# Patient Record
Sex: MALE | Race: WHITE | NOT HISPANIC OR LATINO | URBAN - NONMETROPOLITAN AREA
[De-identification: names, ages, dates, MRNs, and addresses within clinical notes are randomized per-mention and may not be internally consistent; named-entity substitution may affect disease eponyms.]

---

## 2019-11-11 ENCOUNTER — IMPORTED ENCOUNTER (OUTPATIENT)
Dept: URBAN - NONMETROPOLITAN AREA CLINIC 1 | Facility: CLINIC | Age: 69
End: 2019-11-11

## 2019-11-11 PROBLEM — H26.493: Noted: 2019-11-11

## 2019-11-11 PROBLEM — Z96.1: Noted: 2019-11-11

## 2019-11-11 PROBLEM — H52.4: Noted: 2019-11-11

## 2019-11-11 PROCEDURE — 92012 INTRM OPH EXAM EST PATIENT: CPT

## 2019-11-11 NOTE — PATIENT DISCUSSION
History of Herpes keratitis - Discussed diagnosis in detail with  patient - D/C Zirgan    - D/C Besivance  - D/C Valtrex - Continue to monitor Pseudophakia OU with mild PCO OU- Discussed diagnosis in detail with patient- Patient is stable at this time - PCO noted but stable and no treatment needed at this time - May call me and being that patient lives so far away we can set patient up for YAG PC with Dr. Lezlie Siemens when he is ready - BAT at next visit - Continue to monitorPresbyopia OU- Discussed refractive status in detail with patient- New glasses Rx given but not required  - Continue to monitor; 's Notes: MRDFE 5/31/18

## 2020-02-03 ENCOUNTER — IMPORTED ENCOUNTER (OUTPATIENT)
Dept: URBAN - NONMETROPOLITAN AREA CLINIC 1 | Facility: CLINIC | Age: 70
End: 2020-02-03

## 2020-02-03 PROBLEM — H52.4: Noted: 2020-02-03

## 2020-02-03 PROBLEM — B00.52: Noted: 2020-02-03

## 2020-02-03 PROBLEM — H26.493: Noted: 2020-02-03

## 2020-02-03 PROBLEM — Z96.1: Noted: 2020-02-03

## 2020-02-03 PROCEDURE — 99213 OFFICE O/P EST LOW 20 MIN: CPT

## 2020-02-03 NOTE — PATIENT DISCUSSION
History of Herpes keratitis - Discussed diagnosis in detail with  patient - Denrites noted today OD - (-) for [facility]- Start Valtrex 1 gram TID x 7 days .  Rx sent to pharmacy- Start Besivance TID OD until sample is gone - Start Prolensa QD OD until sample given today - Start E-mycin ointment at bedtimeOU Rx sent to pharmacy- Continue to monitor ------------------------------previous notes-------------------------Pseudophakia OU with mild PCO OU- Discussed diagnosis in detail with patient- Patient is stable at this time - PCO noted but stable and no treatment needed at this time - May call me and being that patient lives so far away we can set patient up for YAG PC with Dr. Zack Gusman when he is ready - BAT at next visit - Continue to monitorPresbyopia OU- Discussed refractive status in detail with patient- New glasses Rx given but not required  - Continue to monitor; 's Notes: MRDFE 5/31/18

## 2020-11-24 ENCOUNTER — IMPORTED ENCOUNTER (OUTPATIENT)
Dept: URBAN - NONMETROPOLITAN AREA CLINIC 1 | Facility: CLINIC | Age: 70
End: 2020-11-24

## 2020-11-24 PROBLEM — H52.4: Noted: 2020-11-24

## 2020-11-24 PROBLEM — Z96.1: Noted: 2020-11-24

## 2020-11-24 PROBLEM — H26.493: Noted: 2020-11-24

## 2020-11-24 PROBLEM — H35.372: Noted: 2020-11-24

## 2020-11-24 PROCEDURE — 92015 DETERMINE REFRACTIVE STATE: CPT

## 2020-11-24 PROCEDURE — 92014 COMPRE OPH EXAM EST PT 1/>: CPT

## 2020-11-24 NOTE — PATIENT DISCUSSION
Presbyopia OU- Discussed refractive status in detail with patient- MR done new glasses Rx given but happy using OTC readers PRN- Continue to monitor Pseudophakia OU with mild PCO OU- Discussed diagnosis in detail with patient- Patient is stable at this time - PCO noted but stable and no treatment needed at this time - Continue to monitorERM OD-  Discussed findings w/ pt today-  Signs/symptoms associated with changes discussed-  Would like baseline OCT of macula next visit-  Continue to monitor PRN History of Herpes keratitis - Discussed diagnosis in detail with  patient - (-) for [facility] at that time- Previously treated with Valtrex Besivance Prolensa and E-mycin.- Continue to monitor PRN; 's Notes: MRDFE 5/31/18

## 2021-11-30 ENCOUNTER — IMPORTED ENCOUNTER (OUTPATIENT)
Dept: URBAN - NONMETROPOLITAN AREA CLINIC 1 | Facility: CLINIC | Age: 71
End: 2021-11-30

## 2021-11-30 PROBLEM — D31.31: Noted: 2021-11-30

## 2021-11-30 PROBLEM — H35.372: Noted: 2020-11-24

## 2021-11-30 PROBLEM — H52.4: Noted: 2020-11-24

## 2021-11-30 PROBLEM — H26.493: Noted: 2020-11-24

## 2021-11-30 PROBLEM — Z96.1: Noted: 2020-11-24

## 2021-11-30 PROCEDURE — 92015 DETERMINE REFRACTIVE STATE: CPT

## 2021-11-30 PROCEDURE — 92014 COMPRE OPH EXAM EST PT 1/>: CPT

## 2021-11-30 NOTE — PATIENT DISCUSSION
Presbyopia OU- Discussed refractive status in detail with patient- MR done by me new glasses Rx given - Continue to monitor Pseudophakia OU with mild PCO OU- Discussed diagnosis in detail with patient- Patient is stable at this time - PCO noted but stable and no treatment needed at this time - Continue to monitorERM OD-  Discussed findings w/ pt today-  Signs/symptoms associated with changes discussed-  Would like baseline OCT of macula in near future-  Continue to monitor PRN Chorioretinal Nevus OD-  Discussed findings w/ pt today-  Stable in shape/size-  Patient has hx of melanoma diagnosis by dermatologist in the past will need to monitor closely.  -  May need to obtain Optos next visit for baseline-  Monitor yearly or PRN History of Herpes keratitis - Discussed diagnosis in detail with  patient - (-) for [facility] at that time- Previously treated with Valtrex Besivance Prolensa and E-mycin.- Continue to monitor PRN; 's Notes: MR  11/30/2021 (by Shelbi)DFE  11/30/2021

## 2022-04-09 ASSESSMENT — TONOMETRY
OS_IOP_MMHG: 11
OS_IOP_MMHG: 10
OD_IOP_MMHG: 10
OS_IOP_MMHG: 10
OD_IOP_MMHG: 09
OS_IOP_MMHG: 10
OD_IOP_MMHG: 10

## 2022-04-09 ASSESSMENT — VISUAL ACUITY
OS_SC: 20/20
OD_CC: 20/40+1
OD_CC: 20/22-2
OD_CC: 20/30-
OD_SC: 20/25
OS_CC: 20/50-
OS_PH: 20/29-
OS_CC: 20/20
OD_PH: 20/20
OS_CC: 20/20

## 2022-12-02 ENCOUNTER — ESTABLISHED PATIENT (OUTPATIENT)
Dept: URBAN - NONMETROPOLITAN AREA CLINIC 1 | Facility: CLINIC | Age: 72
End: 2022-12-02

## 2022-12-02 PROCEDURE — 92014 COMPRE OPH EXAM EST PT 1/>: CPT

## 2022-12-02 PROCEDURE — 92499OP2 OPTOMAP RETINAL SCREENING BOTH EYES

## 2022-12-02 PROCEDURE — 92015 DETERMINE REFRACTIVE STATE: CPT

## 2022-12-02 ASSESSMENT — VISUAL ACUITY
OS_SC: 20/20
OD_SC: 20/20

## 2022-12-02 ASSESSMENT — TONOMETRY
OD_IOP_MMHG: 10
OS_IOP_MMHG: 10

## 2023-12-04 ENCOUNTER — ESTABLISHED PATIENT (OUTPATIENT)
Dept: URBAN - NONMETROPOLITAN AREA CLINIC 1 | Facility: CLINIC | Age: 73
End: 2023-12-04

## 2023-12-04 DIAGNOSIS — H35.372: ICD-10-CM

## 2023-12-04 DIAGNOSIS — H35.361: ICD-10-CM

## 2023-12-04 DIAGNOSIS — H26.493: ICD-10-CM

## 2023-12-04 DIAGNOSIS — H52.4: ICD-10-CM

## 2023-12-04 PROCEDURE — 99214 OFFICE O/P EST MOD 30 MIN: CPT

## 2023-12-04 PROCEDURE — 92134 CPTRZ OPH DX IMG PST SGM RTA: CPT

## 2023-12-04 PROCEDURE — 92015 DETERMINE REFRACTIVE STATE: CPT

## 2023-12-04 ASSESSMENT — VISUAL ACUITY
OD_SC: 20/40
OS_SC: 20/30
OD_PH: 20/25
OU_SC: 20/25

## 2023-12-04 ASSESSMENT — TONOMETRY
OD_IOP_MMHG: 10
OS_IOP_MMHG: 10

## 2024-01-10 ENCOUNTER — EMERGENCY VISIT (OUTPATIENT)
Dept: URBAN - NONMETROPOLITAN AREA CLINIC 1 | Facility: CLINIC | Age: 74
End: 2024-01-10

## 2024-01-10 DIAGNOSIS — H16.001: ICD-10-CM

## 2024-01-10 PROCEDURE — 99213 OFFICE O/P EST LOW 20 MIN: CPT

## 2024-01-10 ASSESSMENT — VISUAL ACUITY
OD_PH: 20/25
OS_SC: 20/30
OD_SC: 20/40

## 2024-01-10 ASSESSMENT — TONOMETRY
OD_IOP_MMHG: 10
OS_IOP_MMHG: 10

## 2024-01-16 ENCOUNTER — FOLLOW UP (OUTPATIENT)
Dept: URBAN - NONMETROPOLITAN AREA CLINIC 1 | Facility: CLINIC | Age: 74
End: 2024-01-16

## 2024-01-16 DIAGNOSIS — H16.001: ICD-10-CM

## 2024-01-16 PROCEDURE — 99213 OFFICE O/P EST LOW 20 MIN: CPT

## 2024-01-16 ASSESSMENT — VISUAL ACUITY
OU_SC: 20/25
OD_SC: 20/30
OS_SC: 20/30

## 2024-02-14 ENCOUNTER — CONSULTATION/EVALUATION (OUTPATIENT)
Dept: URBAN - NONMETROPOLITAN AREA CLINIC 1 | Facility: CLINIC | Age: 74
End: 2024-02-14

## 2024-02-14 DIAGNOSIS — H26.493: ICD-10-CM

## 2024-02-14 PROCEDURE — 66821 AFTER CATARACT LASER SURGERY: CPT

## 2024-02-14 PROCEDURE — 99214 OFFICE O/P EST MOD 30 MIN: CPT

## 2024-02-14 ASSESSMENT — VISUAL ACUITY
OD_BAT: 20/25+
OS_SC: 20/32+2
OS_BAT: 20/32+
OD_SC: 20/20-1

## 2024-02-14 ASSESSMENT — TONOMETRY
OD_IOP_MMHG: 10
OS_IOP_MMHG: 10

## 2024-03-08 ENCOUNTER — EMERGENCY VISIT (OUTPATIENT)
Dept: URBAN - NONMETROPOLITAN AREA CLINIC 1 | Facility: CLINIC | Age: 74
End: 2024-03-08

## 2024-03-08 DIAGNOSIS — H16.001: ICD-10-CM

## 2024-03-08 PROCEDURE — 99213 OFFICE O/P EST LOW 20 MIN: CPT

## 2024-03-08 RX ORDER — TOBRAMYCIN AND DEXAMETHASONE 1; 3 MG/ML; MG/ML: 1 SUSPENSION/ DROPS OPHTHALMIC

## 2024-03-08 ASSESSMENT — VISUAL ACUITY
OS_PH: 20/40+1
OD_SC: 20/25-1
OS_SC: 20/40-1

## 2024-03-08 ASSESSMENT — TONOMETRY: OS_IOP_MMHG: 12

## 2024-07-17 ENCOUNTER — CLINIC PROCEDURE ONLY (OUTPATIENT)
Dept: URBAN - NONMETROPOLITAN AREA CLINIC 1 | Facility: CLINIC | Age: 74
End: 2024-07-17

## 2024-07-17 DIAGNOSIS — H26.491: ICD-10-CM

## 2024-07-17 PROCEDURE — 66821 AFTER CATARACT LASER SURGERY: CPT

## 2024-07-17 ASSESSMENT — VISUAL ACUITY
OD_BAT: 20/30
OS_SC: 20/30
OD_SC: 20/20

## 2024-07-17 ASSESSMENT — TONOMETRY
OS_IOP_MMHG: 12
OD_IOP_MMHG: 12

## 2024-07-31 ENCOUNTER — POST-OP (OUTPATIENT)
Dept: URBAN - NONMETROPOLITAN AREA CLINIC 1 | Facility: CLINIC | Age: 74
End: 2024-07-31

## 2024-07-31 DIAGNOSIS — Z98.890: ICD-10-CM

## 2024-07-31 PROCEDURE — 99024 POSTOP FOLLOW-UP VISIT: CPT

## 2024-07-31 ASSESSMENT — VISUAL ACUITY
OD_SC: 20/20
OS_SC: 20/20-1
OU_SC: 20/20

## 2024-07-31 ASSESSMENT — TONOMETRY
OS_IOP_MMHG: 16
OD_IOP_MMHG: 15

## 2025-04-25 ENCOUNTER — FOLLOW UP (OUTPATIENT)
Age: 75
End: 2025-04-25

## 2025-04-25 DIAGNOSIS — H35.361: ICD-10-CM

## 2025-04-25 DIAGNOSIS — H35.372: ICD-10-CM

## 2025-04-25 DIAGNOSIS — H43.822: ICD-10-CM

## 2025-04-25 DIAGNOSIS — D31.31: ICD-10-CM

## 2025-04-25 PROCEDURE — 99213 OFFICE O/P EST LOW 20 MIN: CPT

## 2025-04-25 PROCEDURE — 92134 CPTRZ OPH DX IMG PST SGM RTA: CPT

## 2025-08-18 ENCOUNTER — EMERGENCY VISIT (OUTPATIENT)
Age: 75
End: 2025-08-18

## 2025-08-18 DIAGNOSIS — H01.02A: ICD-10-CM

## 2025-08-18 DIAGNOSIS — H01.02B: ICD-10-CM

## 2025-08-18 DIAGNOSIS — T15.11XA: ICD-10-CM

## 2025-08-18 PROCEDURE — 99213 OFFICE O/P EST LOW 20 MIN: CPT
